# Patient Record
Sex: MALE | Race: WHITE | NOT HISPANIC OR LATINO | URBAN - METROPOLITAN AREA
[De-identification: names, ages, dates, MRNs, and addresses within clinical notes are randomized per-mention and may not be internally consistent; named-entity substitution may affect disease eponyms.]

---

## 2019-04-16 ENCOUNTER — INPATIENT (INPATIENT)
Facility: HOSPITAL | Age: 34
LOS: 0 days | Discharge: HOME | End: 2019-04-17
Attending: INTERNAL MEDICINE | Admitting: INTERNAL MEDICINE
Payer: MEDICAID

## 2019-04-16 VITALS — WEIGHT: 214.95 LBS | HEIGHT: 69 IN

## 2019-04-16 DIAGNOSIS — F11.20 OPIOID DEPENDENCE, UNCOMPLICATED: ICD-10-CM

## 2019-04-16 DIAGNOSIS — R56.9 UNSPECIFIED CONVULSIONS: ICD-10-CM

## 2019-04-16 LAB
ALBUMIN SERPL ELPH-MCNC: 5 G/DL — SIGNIFICANT CHANGE UP (ref 3.5–5.2)
ALP SERPL-CCNC: 47 U/L — SIGNIFICANT CHANGE UP (ref 30–115)
ALT FLD-CCNC: 48 U/L — HIGH (ref 0–41)
AMPHET UR-MCNC: NEGATIVE — SIGNIFICANT CHANGE UP
ANION GAP SERPL CALC-SCNC: 14 MMOL/L — SIGNIFICANT CHANGE UP (ref 7–14)
APAP SERPL-MCNC: <5 UG/ML — LOW (ref 10–30)
APPEARANCE UR: CLEAR — SIGNIFICANT CHANGE UP
APTT BLD: 25.2 SEC — LOW (ref 27–39.2)
AST SERPL-CCNC: 25 U/L — SIGNIFICANT CHANGE UP (ref 0–41)
BARBITURATES UR SCN-MCNC: NEGATIVE — SIGNIFICANT CHANGE UP
BASOPHILS # BLD AUTO: 0.02 K/UL — SIGNIFICANT CHANGE UP (ref 0–0.2)
BASOPHILS NFR BLD AUTO: 0.1 % — SIGNIFICANT CHANGE UP (ref 0–1)
BENZODIAZ UR-MCNC: POSITIVE
BILIRUB SERPL-MCNC: 0.5 MG/DL — SIGNIFICANT CHANGE UP (ref 0.2–1.2)
BILIRUB UR-MCNC: NEGATIVE — SIGNIFICANT CHANGE UP
BUN SERPL-MCNC: 14 MG/DL — SIGNIFICANT CHANGE UP (ref 10–20)
CALCIUM SERPL-MCNC: 9.6 MG/DL — SIGNIFICANT CHANGE UP (ref 8.5–10.1)
CHLORIDE SERPL-SCNC: 100 MMOL/L — SIGNIFICANT CHANGE UP (ref 98–110)
CO2 SERPL-SCNC: 22 MMOL/L — SIGNIFICANT CHANGE UP (ref 17–32)
COCAINE METAB.OTHER UR-MCNC: NEGATIVE — SIGNIFICANT CHANGE UP
COLOR SPEC: YELLOW — SIGNIFICANT CHANGE UP
CREAT SERPL-MCNC: 0.9 MG/DL — SIGNIFICANT CHANGE UP (ref 0.7–1.5)
DIFF PNL FLD: ABNORMAL
DRUG SCREEN 1, URINE RESULT: SIGNIFICANT CHANGE UP
EOSINOPHIL # BLD AUTO: 0 K/UL — SIGNIFICANT CHANGE UP (ref 0–0.7)
EOSINOPHIL NFR BLD AUTO: 0 % — SIGNIFICANT CHANGE UP (ref 0–8)
ETHANOL SERPL-MCNC: <10 MG/DL — SIGNIFICANT CHANGE UP
GLUCOSE SERPL-MCNC: 112 MG/DL — HIGH (ref 70–99)
GLUCOSE UR QL: NEGATIVE MG/DL — SIGNIFICANT CHANGE UP
HCT VFR BLD CALC: 40 % — LOW (ref 42–52)
HGB BLD-MCNC: 14 G/DL — SIGNIFICANT CHANGE UP (ref 14–18)
IMM GRANULOCYTES NFR BLD AUTO: 0.6 % — HIGH (ref 0.1–0.3)
INR BLD: 1.33 RATIO — HIGH (ref 0.65–1.3)
KETONES UR-MCNC: 15
LEUKOCYTE ESTERASE UR-ACNC: NEGATIVE — SIGNIFICANT CHANGE UP
LYMPHOCYTES # BLD AUTO: 1.9 K/UL — SIGNIFICANT CHANGE UP (ref 1.2–3.4)
LYMPHOCYTES # BLD AUTO: 11.9 % — LOW (ref 20.5–51.1)
MCHC RBC-ENTMCNC: 27.7 PG — SIGNIFICANT CHANGE UP (ref 27–31)
MCHC RBC-ENTMCNC: 35 G/DL — SIGNIFICANT CHANGE UP (ref 32–37)
MCV RBC AUTO: 79.1 FL — LOW (ref 80–94)
METHADONE UR-MCNC: NEGATIVE — SIGNIFICANT CHANGE UP
MONOCYTES # BLD AUTO: 1.23 K/UL — HIGH (ref 0.1–0.6)
MONOCYTES NFR BLD AUTO: 7.7 % — SIGNIFICANT CHANGE UP (ref 1.7–9.3)
NEUTROPHILS # BLD AUTO: 12.73 K/UL — HIGH (ref 1.4–6.5)
NEUTROPHILS NFR BLD AUTO: 79.7 % — HIGH (ref 42.2–75.2)
NITRITE UR-MCNC: NEGATIVE — SIGNIFICANT CHANGE UP
NRBC # BLD: 0 /100 WBCS — SIGNIFICANT CHANGE UP (ref 0–0)
OPIATES UR-MCNC: NEGATIVE — SIGNIFICANT CHANGE UP
PCP UR-MCNC: NEGATIVE — SIGNIFICANT CHANGE UP
PH UR: 5.5 — SIGNIFICANT CHANGE UP (ref 5–8)
PLATELET # BLD AUTO: 264 K/UL — SIGNIFICANT CHANGE UP (ref 130–400)
POTASSIUM SERPL-MCNC: 3.9 MMOL/L — SIGNIFICANT CHANGE UP (ref 3.5–5)
POTASSIUM SERPL-SCNC: 3.9 MMOL/L — SIGNIFICANT CHANGE UP (ref 3.5–5)
PROPOXYPHENE QUALITATIVE URINE RESULT: NEGATIVE — SIGNIFICANT CHANGE UP
PROT SERPL-MCNC: 7.6 G/DL — SIGNIFICANT CHANGE UP (ref 6–8)
PROT UR-MCNC: 30 MG/DL
PROTHROM AB SERPL-ACNC: 15.2 SEC — HIGH (ref 9.95–12.87)
RBC # BLD: 5.06 M/UL — SIGNIFICANT CHANGE UP (ref 4.7–6.1)
RBC # FLD: 12.2 % — SIGNIFICANT CHANGE UP (ref 11.5–14.5)
RBC CASTS # UR COMP ASSIST: ABNORMAL /HPF
SALICYLATES SERPL-MCNC: <0.3 MG/DL — LOW (ref 4–30)
SODIUM SERPL-SCNC: 136 MMOL/L — SIGNIFICANT CHANGE UP (ref 135–146)
SP GR SPEC: 1.02 — SIGNIFICANT CHANGE UP (ref 1.01–1.03)
THC UR QL: NEGATIVE — SIGNIFICANT CHANGE UP
TROPONIN T SERPL-MCNC: <0.01 NG/ML — SIGNIFICANT CHANGE UP
UROBILINOGEN FLD QL: 0.2 MG/DL — SIGNIFICANT CHANGE UP (ref 0.2–0.2)
WBC # BLD: 15.98 K/UL — HIGH (ref 4.8–10.8)
WBC # FLD AUTO: 15.98 K/UL — HIGH (ref 4.8–10.8)
WBC UR QL: SIGNIFICANT CHANGE UP /HPF

## 2019-04-16 PROCEDURE — 71046 X-RAY EXAM CHEST 2 VIEWS: CPT | Mod: 26

## 2019-04-16 PROCEDURE — 99285 EMERGENCY DEPT VISIT HI MDM: CPT

## 2019-04-16 PROCEDURE — 70450 CT HEAD/BRAIN W/O DYE: CPT | Mod: 26

## 2019-04-16 RX ORDER — PHENOBARBITAL 60 MG
32.4 TABLET ORAL EVERY 6 HOURS
Qty: 0 | Refills: 0 | Status: DISCONTINUED | OUTPATIENT
Start: 2019-04-18 | End: 2019-04-17

## 2019-04-16 RX ORDER — METHADONE HYDROCHLORIDE 40 MG/1
10 TABLET ORAL EVERY 12 HOURS
Qty: 0 | Refills: 0 | Status: DISCONTINUED | OUTPATIENT
Start: 2019-04-16 | End: 2019-04-17

## 2019-04-16 RX ORDER — PHENOBARBITAL 60 MG
TABLET ORAL
Qty: 0 | Refills: 0 | Status: DISCONTINUED | OUTPATIENT
Start: 2019-04-16 | End: 2019-04-17

## 2019-04-16 RX ORDER — OXYCODONE HYDROCHLORIDE 5 MG/1
1 TABLET ORAL
Qty: 0 | Refills: 0 | COMMUNITY

## 2019-04-16 RX ORDER — NICOTINE POLACRILEX 2 MG
1 GUM BUCCAL DAILY
Qty: 0 | Refills: 0 | Status: DISCONTINUED | OUTPATIENT
Start: 2019-04-16 | End: 2019-04-17

## 2019-04-16 RX ORDER — TETANUS TOXOID, REDUCED DIPHTHERIA TOXOID AND ACELLULAR PERTUSSIS VACCINE, ADSORBED 5; 2.5; 8; 8; 2.5 [IU]/.5ML; [IU]/.5ML; UG/.5ML; UG/.5ML; UG/.5ML
0.5 SUSPENSION INTRAMUSCULAR ONCE
Qty: 0 | Refills: 0 | Status: DISCONTINUED | OUTPATIENT
Start: 2019-04-16 | End: 2019-04-16

## 2019-04-16 RX ORDER — PHENOBARBITAL 60 MG
32.4 TABLET ORAL EVERY 12 HOURS
Qty: 0 | Refills: 0 | Status: CANCELLED | OUTPATIENT
Start: 2019-04-19 | End: 2019-04-17

## 2019-04-16 RX ORDER — PHENOBARBITAL 60 MG
32.4 TABLET ORAL EVERY 6 HOURS
Qty: 0 | Refills: 0 | Status: DISCONTINUED | OUTPATIENT
Start: 2019-04-16 | End: 2019-04-17

## 2019-04-16 RX ORDER — DIAZEPAM 5 MG
10 TABLET ORAL ONCE
Qty: 0 | Refills: 0 | Status: DISCONTINUED | OUTPATIENT
Start: 2019-04-16 | End: 2019-04-16

## 2019-04-16 RX ORDER — TETANUS TOXOID, REDUCED DIPHTHERIA TOXOID AND ACELLULAR PERTUSSIS VACCINE, ADSORBED 5; 2.5; 8; 8; 2.5 [IU]/.5ML; [IU]/.5ML; UG/.5ML; UG/.5ML; UG/.5ML
0.5 SUSPENSION INTRAMUSCULAR ONCE
Qty: 0 | Refills: 0 | Status: COMPLETED | OUTPATIENT
Start: 2019-04-16 | End: 2019-04-16

## 2019-04-16 RX ADMIN — Medication 32.4 MILLIGRAM(S): at 23:27

## 2019-04-16 RX ADMIN — Medication 32.4 MILLIGRAM(S): at 17:48

## 2019-04-16 RX ADMIN — TETANUS TOXOID, REDUCED DIPHTHERIA TOXOID AND ACELLULAR PERTUSSIS VACCINE, ADSORBED 0.5 MILLILITER(S): 5; 2.5; 8; 8; 2.5 SUSPENSION INTRAMUSCULAR at 12:27

## 2019-04-16 RX ADMIN — Medication 10 MILLIGRAM(S): at 14:22

## 2019-04-16 NOTE — H&P ADULT - HISTORY OF PRESENT ILLNESS
34 y/o male reports poor sleep last, went to work today and reportedly passed out, fell to floor outside and began convulsing. Witnessed seizure, arms flailing, grunting sounds, post ictal confusion, no urinary incontinence. no occurences like this in the past.           Upon further conversing patient admitted to substance abuse:  -Xanax: 5-6 days/week,  AVG intake: 2-3 mg/day, duration 2-3 months, last taken 24-48 hrs ago.  -Opiates: on/off X 7 yrs, Oxycodone 30mg Tabs, AVG intake: 120mg/day, last used 5 days ago but took 1/4 of a 8/2mg      Suboxone tab today.   -Alcohol: No

## 2019-04-16 NOTE — CONSULT NOTE ADULT - ATTENDING COMMENTS
History and exam as above confirmed with patient. Plan also discussed. Will do VEEG to assess for ongoing seizure activity.

## 2019-04-16 NOTE — H&P ADULT - NSHPLABSRESULTS_GEN_ALL_CORE
14.0   15.98 )-----------( 264      ( 16 Apr 2019 12:37 )             40.0       04-16    136  |  100  |  14  ----------------------------<  112<H>  3.9   |  22  |  0.9    Ca    9.6      16 Apr 2019 12:37    TPro  7.6  /  Alb  5.0  /  TBili  0.5  /  DBili  x   /  AST  25  /  ALT  48<H>  /  AlkPhos  47  04-16        PT/INR - ( 16 Apr 2019 12:37 )   PT: 15.20 sec;   INR: 1.33 ratio         PTT - ( 16 Apr 2019 12:37 )  PTT:25.2 sec      CARDIAC MARKERS ( 16 Apr 2019 12:37 )  x     / <0.01 ng/mL / x     / x     / x       CT Head No Cont (04.16.19 @ 12:57) >      No acute intracranial hemorrhage.    In view of the history of seizure, MRI of the brain is recommended for   further evaluation if clinically warranted and not contra-indicated in   this patient.    Xray Chest 2 Views PA/Lat (04.16.19 @ 12:42) >      Impression:    No radiographic evidence of cardiopulmonary disease.

## 2019-04-16 NOTE — CONSULT NOTE ADULT - SUBJECTIVE AND OBJECTIVE BOX
Neurology/Epilepsy Consult:    BJ BARKER 33y Male  [id number]    Patient is a 33y old right-handed Male who presents with a chief complaint of New Onset Seizure (2019 14:04)      HPI:  34 y/o male reports poor sleep last, went to work today and reportedly passed out, fell to floor outside and began convulsing. Witnessed seizure, arms flailing, grunting sounds, post ictal confusion, no urinary incontinence. no occurences like this in the past.           Upon further conversing patient admitted to substance abuse:  -Xanax: 5-6 days/week,  AVG intake: 2-3 mg/day, duration 2-3 months, last taken 24-48 hrs ago.  -Opiates: on/off X 7 yrs, Oxycodone 30mg Tabs, AVG intake: 120mg/day, last used 5 days ago but took 1/4 of a 8/2mg      Suboxone tab today.   -Alcohol: No (2019 14:04)        PAST MEDICAL & SURGICAL HISTORY:  Herpes simplex type 1 infection  Opiate dependence  Benzodiazepine abuse  Tobacco dependence  Chronic back pain  No significant past surgical history        FAMILY HISTORY:        Social History: (-) x 3      Risk factors:  Born full-term, , no complications  Normal growth and development  No febrile seizures/CNS infections  No head trauma with loss of consciousness      Allergy:  No Known Allergies        Home Medications 9as per I-STOP):  Oxycodone 30 mg tabs: 1 tab 4 times per day PRN (last filled 3/28/2019)      MEDICATIONS  (STANDING):  nicotine -  14 mG/24Hr(s) Patch 1 Patch Transdermal daily  PHENobarbital 32.4 milliGRAM(s) Oral every 6 hours  PHENobarbital   Oral     MEDICATIONS  (PRN):  LORazepam   Injectable 2 milliGRAM(s) IV Push once PRN generalized tonic-clonic seizure lasting longer than 2 minutes  methadone    Tablet 10 milliGRAM(s) Oral every 12 hours PRN opiate withdrawal          T(F): 97.9 (19 @ 13:59), Max: 98.4 (19 @ 11:11)  HR: 83 (19 @ 13:59) (83 - 83)  BP: 144/81 (19 @ 13:59) (122/65 - 144/81)  RR: 18 (19 @ 13:59) (18 - 18)  SpO2: 100% (-19 @ 13:59) (97% - 100%)      Neurologic Examination:  General:  Appearance is consistent with chronologic age.  No abnormal facies.   General: The patient is oriented to person, place, time and date.  Recent and remote memory intact.  Follows 4-step directions. Fund of knowledge is intact and normal.  Language with normal repetition, comprehension and naming.  Nondysarthric.    Cranial nerves: EOMI w/o nystagmus, skew or reported double vision.  PERRL.  No ptosis/weakness of eyelid closure.  Facial sensation is normal with normal bite.  No facial asymmetry.  Hearing grossly intact b/l.  Palate elevates midline.  Tongue midline.  Motor examination:   Normal tone, bulk and range of motion.  No tenderness, twitching, tremors or involuntary movements.  Formal Muscle Strength Testin/5 UE; 5/5 LE.  No observable drift.  Reflexes:   2+ b/l pectoralis, biceps, triceps, brachioradialis, patella and Achilles.  Plantar response downgoing b/l.  Jaw jerk, Jemima, clonus absent.  Sensory examination:   Intact to light touch and pinprick, pain, temperature and proprioception and vibration in all extremities.  Cerebellum:   FTN/HKS intact with normal JANAE in all limbs.  No dysmetria or dysdiadokinesia.  Gait narrow based and normal.      Labs:                         14.0   15.98 )-----------( 264      ( 2019 12:37 )             40.0     -16    136  |  100  |  14  ----------------------------<  112<H>  3.9   |  22  |  0.9    Ca    9.6      2019 12:37    TPro  7.6  /  Alb  5.0  /  TBili  0.5  /  DBili  x   /  AST  25  /  ALT  48<H>  /  AlkPhos  47  -    LIVER FUNCTIONS - ( 2019 12:37 )  Alb: 5.0 g/dL / Pro: 7.6 g/dL / ALK PHOS: 47 U/L / ALT: 48 U/L / AST: 25 U/L / GGT: x           PT/INR - ( 2019 12:37 )   PT: 15.20 sec;   INR: 1.33 ratio         PTT - ( 2019 12:37 )  PTT:25.2 sec  Urinalysis Basic - ( 2019 13:44 )    Color: Yellow / Appearance: Clear / S.025 / pH: x  Gluc: x / Ketone: 15  / Bili: Negative / Urobili: 0.2 mg/dL   Blood: x / Protein: 30 mg/dL / Nitrite: Negative   Leuk Esterase: Negative / RBC: 3-5 /HPF / WBC 3-5 /HPF   Sq Epi: x / Non Sq Epi: x / Bacteria: x          Neuroimaging:  NCHCT: CT Head No Cont:   EXAM:  CT BRAIN          PROCEDURE DATE:  2019      INTERPRETATION:  Clinical History / Reason for exam: Seizure.    CT SCAN OF THE BRAIN WITHOUT CONTRAST    TECHNIQUE:    Multiple transaxial noncontrast CT images of the brain were obtained from   base to vertex. Sagittal and coronal reformatted images were submitted.    COMPARISON:    Noncontrast CT scan of the brain dated 2008.    FINDINGS:    The third, fourth, and lateral ventricles are normal in size and   position.     There is no shift of the midline structures or evidence of acute   intracranial hemorrhage or depressed skull fracture.     There is a 0.5 cm hypodense lesion in the midline just superior to the   third ventricle consistent with a lipoma.    IMPRESSION:     In comparison with the prior noncontrast CT scan of the brain dated   2018:    Stable examination.    No acute intracranial hemorrhage.    In view of the history of seizure, MRI of the brain is recommended for   further evaluation if clinically warranted and not contra-indicated in   this patient.    BIANKA LARKIN M.D., ATTENDING RADIOLOGIST  This document has been electronically signed. 2019  1:22PM  (19 @ 12:57)        Assessment:  This is a 33y Male with h/o polysubstance use, admitted s/p witnessed GTC seizure. Patient denies prior seizure history.      Discussed with Dr. Candelario      Plan:   - VEEG for better characterization and assessment  - Seizure precautions  - No AED fro now  - Ativan 2mg IV PRN for generalized tonic-clonic seizure lasting longer than 2 minutes, or two consecutive seizures without return to baseline in-between (ordered)  - CBC, CMP, Mg, CK (ordered)  - Keep Mg above 2  - Urine drug screen stat (ordered)    Plan discussed with patient in details, all questions answered Neurology/Epilepsy Consult:    BJ BARKER 33y Male  [id number]    Patient is a 33y old right-handed Male who presents with a chief complaint of New Onset Seizure (2019 14:04)      HPI: History obtained from patient and mother. I-STOP records reviewed.  Patient was BIBA after a witnessed seizure at work. Patient reportedly appeared very anxious and was pacing, then he fell and was unresponsive for several minutes with 4 extremities shaking followed by several minutes of confusion. No incontinence, no Daniel's paralysis, no tongue bite reported. Patient denies any prior seizure history. He reports having very limited sleep for the last 3 days.    When patient was interviewed without mom's presence, he reported taking controlled substances. He ran out of Oxycodone several days ago that Rx to him by PMD for back pain (last use 4/10/19 in PM). It must be noted that per I-STOP patient filled Oxycodone 30mg 120 tablets 3/28/19. In addition, patient uses Xanax 2-4 mg/day that is not prescribed to him, last use 19. In the last few days patient also used 2 strips of Suboxone 8mg/2mg that he bought without Rx. Patient reports taking high doses of opiates for about 6 years, started using Xanax several months ago.              PAST MEDICAL & SURGICAL HISTORY:  Opiate dependence  Benzodiazepine abuse  Tobacco dependence  Chronic back pain  No significant past surgical history        FAMILY HISTORY:  No seizures      Social History:   Lives with parents  Self-employed, works FT  Completed 2 yrs of college        Risk factors:  Born full-term, , no complications  Normal growth and development  No febrile seizures/CNS infections  No head trauma with loss of consciousness      Allergy:  No Known Allergies        Home Medications):  Oxycodone 30 mg tabs: 1 tab 4 times per day PRN (last filled 3/28/2019)  Xanax 2-4 mg/day (not prescribed)  Suboxone occasionally (not prescribed)      MEDICATIONS  (STANDING):  nicotine -  14 mG/24Hr(s) Patch 1 Patch Transdermal daily  PHENobarbital 32.4 milliGRAM(s) Oral every 6 hours  PHENobarbital   Oral     MEDICATIONS  (PRN):  LORazepam   Injectable 2 milliGRAM(s) IV Push once PRN generalized tonic-clonic seizure lasting longer than 2 minutes  methadone    Tablet 10 milliGRAM(s) Oral every 12 hours PRN opiate withdrawal          T(F): 97.9 (04-16-19 @ 13:59), Max: 98.4 (19 @ 11:11)  HR: 83 (19 @ 13:59) (83 - 83)  BP: 144/81 (19 @ 13:59) (122/65 - 144/81)  RR: 18 (19 @ 13:59) (18 - 18)  SpO2: 100% (19 @ 13:59) (97% - 100%)      Neurologic Examination:  General:  Appearance is consistent with chronologic age.  No abnormal facies.   General: The patient is oriented to person, place, time and date.  Recent and remote memory intact.  Follows 4-step directions. Fund of knowledge is intact and normal.  Language with normal repetition, comprehension and naming.  Nondysarthric.    Cranial nerves: EOMI w/o nystagmus, skew or reported double vision.  PERRL.  No ptosis/weakness of eyelid closure.  Facial sensation is normal with normal bite.  No facial asymmetry.  Hearing grossly intact b/l.  Palate elevates midline.  Tongue midline.  Motor examination:   Normal tone, bulk and range of motion.  No tenderness, twitching, tremors or involuntary movements.  Formal Muscle Strength Testin/5 UE; 5/5 LE.  No observable drift.  Reflexes:   2+ b/l   Sensory examination:   Intact to light touch and pinprick, pain  Cerebellum:   FTN/HKS intact with normal JANAE in all limbs.  No dysmetria or dysdiadokinesia.  Gait narrow based and normal.      Labs:                         14.0   15.98 )-----------( 264      ( 2019 12:37 )             40.0     -16    136  |  100  |  14  ----------------------------<  112<H>  3.9   |  22  |  0.9    Ca    9.6      2019 12:37    TPro  7.6  /  Alb  5.0  /  TBili  0.5  /  DBili  x   /  AST  25  /  ALT  48<H>  /  AlkPhos  47  -    LIVER FUNCTIONS - ( 2019 12:37 )  Alb: 5.0 g/dL / Pro: 7.6 g/dL / ALK PHOS: 47 U/L / ALT: 48 U/L / AST: 25 U/L / GGT: x           PT/INR - ( 2019 12:37 )   PT: 15.20 sec;   INR: 1.33 ratio         PTT - ( 2019 12:37 )  PTT:25.2 sec  Urinalysis Basic - ( 2019 13:44 )    Color: Yellow / Appearance: Clear / S.025 / pH: x  Gluc: x / Ketone: 15  / Bili: Negative / Urobili: 0.2 mg/dL   Blood: x / Protein: 30 mg/dL / Nitrite: Negative   Leuk Esterase: Negative / RBC: 3-5 /HPF / WBC 3-5 /HPF   Sq Epi: x / Non Sq Epi: x / Bacteria: x          Neuroimaging:  NCHCT: CT Head No Cont:   EXAM:  CT BRAIN          PROCEDURE DATE:  2019      INTERPRETATION:  Clinical History / Reason for exam: Seizure.    CT SCAN OF THE BRAIN WITHOUT CONTRAST    TECHNIQUE:    Multiple transaxial noncontrast CT images of the brain were obtained from   base to vertex. Sagittal and coronal reformatted images were submitted.    COMPARISON:    Noncontrast CT scan of the brain dated 2008.    FINDINGS:    The third, fourth, and lateral ventricles are normal in size and   position.     There is no shift of the midline structures or evidence of acute   intracranial hemorrhage or depressed skull fracture.     There is a 0.5 cm hypodense lesion in the midline just superior to the   third ventricle consistent with a lipoma.    IMPRESSION:     In comparison with the prior noncontrast CT scan of the brain dated   2018:    Stable examination.    No acute intracranial hemorrhage.    In view of the history of seizure, MRI of the brain is recommended for   further evaluation if clinically warranted and not contra-indicated in   this patient.    BIANKA LARKIN M.D., ATTENDING RADIOLOGIST  This document has been electronically signed. 2019  1:22PM  (19 @ 12:57)        Assessment:  This is a 33y Male with h/o polysubstance use, admitted s/p witnessed GTC seizure. Patient denies prior seizure history. The history is highly suggestive of provoked seizure, but the possibility of underlying seizure disorder needs to be ruled out.      Discussed with Dr. Candelario      Plan:   - VEEG for better characterization and assessment  - Seizure precautions  - No AED for now  - Ativan 2mg IV PRN for generalized tonic-clonic seizure lasting longer than 2 minutes, or two consecutive seizures without return to baseline in-between (ordered)  - CBC, CMP, Mg, CK (ordered)  - Keep Mg above 2  - Urine drug screen stat (ordered)  - Detox consult    Plan discussed with patient in details, all questions answered Neurology/Epilepsy Consult:    BJ BARKER 33y Male  MRN-[id number]    Patient is a 33y old right-handed Male who presents with a chief complaint of New Onset Seizure (2019 14:04)      HPI: History obtained from patient and mother. [facility] records reviewed.  Patient was BIBA after a witnessed seizure at work. Patient reportedly appeared very anxious and was pacing, then he fell and was unresponsive for several minutes with 4 extremities shaking followed by several minutes of confusion. No incontinence, no Daniel's paralysis, no tongue bite reported. Patient denies any prior seizure history. He reports having very limited sleep for the last 3 days.    When patient was interviewed without mom's presence, he reported taking controlled substances. He ran out of Oxycodone several days ago that Rx to him by PMD for back pain (last use 4/10/19 in PM). It must be noted that per I-STOP patient filled Oxycodone 30mg 120 tablets 3/28/19. In addition, patient uses Xanax 2-4 mg/day that is not prescribed to him, last use 19. In the last few days patient also used 2 strips of Suboxone 8mg/2mg that he bought without Rx. Patient reports taking high doses of opiates for about 6 years, started using Xanax several months ago.              PAST MEDICAL & SURGICAL HISTORY:  Opiate dependence  Benzodiazepine abuse  Tobacco dependence  Chronic back pain  No significant past surgical history        FAMILY HISTORY:  No seizures      Social History:   Lives with parents  Self-employed, works FT  Completed 2 yrs of college        Risk factors:  Born full-term, , no complications  Normal growth and development  No febrile seizures/CNS infections  No head trauma with loss of consciousness      Allergy:  No Known Allergies        Home Medications):  Oxycodone 30 mg tabs: 1 tab 4 times per day PRN (last filled 3/28/2019)  Xanax 2-4 mg/day (not prescribed)  Suboxone occasionally (not prescribed)      MEDICATIONS  (STANDING):  nicotine -  14 mG/24Hr(s) Patch 1 Patch Transdermal daily  PHENobarbital 32.4 milliGRAM(s) Oral every 6 hours  PHENobarbital   Oral     MEDICATIONS  (PRN):  LORazepam   Injectable 2 milliGRAM(s) IV Push once PRN generalized tonic-clonic seizure lasting longer than 2 minutes  methadone    Tablet 10 milliGRAM(s) Oral every 12 hours PRN opiate withdrawal          T(F): 97.9 (04-16-19 @ 13:59), Max: 98.4 (19 @ 11:11)  HR: 83 (19 @ 13:59) (83 - 83)  BP: 144/81 (19 @ 13:59) (122/65 - 144/81)  RR: 18 (19 @ 13:59) (18 - 18)  SpO2: 100% (19 @ 13:59) (97% - 100%)      Neurologic Examination:  General:  Appearance is consistent with chronologic age.  No abnormal facies.   General: The patient is oriented to person, place, time and date.  Recent and remote memory intact.  Follows 4-step directions. Fund of knowledge is intact and normal.  Language with normal repetition, comprehension and naming.  Nondysarthric.    Cranial nerves: EOMI w/o nystagmus, skew or reported double vision.  PERRL.  No ptosis/weakness of eyelid closure.  Facial sensation is normal with normal bite.  No facial asymmetry.  Hearing grossly intact b/l.  Palate elevates midline.  Tongue midline.  Motor examination:   Normal tone, bulk and range of motion.  No tenderness, twitching, tremors or involuntary movements.  Formal Muscle Strength Testin/5 UE; 5/5 LE.  No observable drift.  Reflexes:   2+ b/l   Sensory examination:   Intact to light touch and pinprick, pain  Cerebellum:   FTN/HKS intact with normal JANAE in all limbs.  No dysmetria or dysdiadokinesia.  Gait narrow based and normal.      Labs:                         14.0   15.98 )-----------( 264      ( 2019 12:37 )             40.0     -16    136  |  100  |  14  ----------------------------<  112<H>  3.9   |  22  |  0.9    Ca    9.6      2019 12:37    TPro  7.6  /  Alb  5.0  /  TBili  0.5  /  DBili  x   /  AST  25  /  ALT  48<H>  /  AlkPhos  47  -    LIVER FUNCTIONS - ( 2019 12:37 )  Alb: 5.0 g/dL / Pro: 7.6 g/dL / ALK PHOS: 47 U/L / ALT: 48 U/L / AST: 25 U/L / GGT: x           PT/INR - ( 2019 12:37 )   PT: 15.20 sec;   INR: 1.33 ratio         PTT - ( 2019 12:37 )  PTT:25.2 sec  Urinalysis Basic - ( 2019 13:44 )    Color: Yellow / Appearance: Clear / S.025 / pH: x  Gluc: x / Ketone: 15  / Bili: Negative / Urobili: 0.2 mg/dL   Blood: x / Protein: 30 mg/dL / Nitrite: Negative   Leuk Esterase: Negative / RBC: 3-5 /HPF / WBC 3-5 /HPF   Sq Epi: x / Non Sq Epi: x / Bacteria: x          Neuroimaging:  NCHCT: CT Head No Cont:   EXAM:  CT BRAIN          PROCEDURE DATE:  2019      INTERPRETATION:  Clinical History / Reason for exam: Seizure.    CT SCAN OF THE BRAIN WITHOUT CONTRAST    TECHNIQUE:    Multiple transaxial noncontrast CT images of the brain were obtained from   base to vertex. Sagittal and coronal reformatted images were submitted.    COMPARISON:    Noncontrast CT scan of the brain dated 2008.    FINDINGS:    The third, fourth, and lateral ventricles are normal in size and   position.     There is no shift of the midline structures or evidence of acute   intracranial hemorrhage or depressed skull fracture.     There is a 0.5 cm hypodense lesion in the midline just superior to the   third ventricle consistent with a lipoma.    IMPRESSION:     In comparison with the prior noncontrast CT scan of the brain dated   2018:    Stable examination.    No acute intracranial hemorrhage.    In view of the history of seizure, MRI of the brain is recommended for   further evaluation if clinically warranted and not contra-indicated in   this patient.    BIANKA LARKIN M.D., ATTENDING RADIOLOGIST  This document has been electronically signed. 2019  1:22PM  (19 @ 12:57) Neurology/Epilepsy Consult:    BJ BARKER 33y Male  MRN-[id number]    Patient is a 33y old right-handed Male who presents with a chief complaint of New Onset Seizure (2019 14:04)      HPI: History obtained from patient and mother. [facility] records reviewed.  Patient was BIBA after a witnessed seizure at work. Patient reportedly appeared very anxious and was pacing, then he fell and was unresponsive for several minutes with 4 extremities shaking followed by several minutes of confusion. No incontinence, no Daniel's paralysis, no tongue bite reported. Patient denies any prior seizure history. He reports having very limited sleep for the last 3 days.    When patient was interviewed without mom's presence, he reported taking controlled substances. He ran out of Oxycodone several days ago (Prescribed to him by PMD for back pain, last use 4/10/19 in PM). It must be noted that per I-STOP patient filled Oxycodone 30mg 120 tablets 3/28/19. In addition, patient uses Xanax 2-4 mg/day that is not prescribed to him, last use 19. In the last few days patient also used 2 strips of Suboxone 8mg/2mg that he bought without Rx. Patient reports taking high doses of opiates for about 6 years, started using Xanax several months ago.              PAST MEDICAL & SURGICAL HISTORY:  Opiate dependence  Benzodiazepine abuse  Tobacco dependence  Chronic back pain  No significant past surgical history        FAMILY HISTORY:  No seizures      Social History:   Lives with parents  Self-employed, works FT  Completed 2 yrs of college        Risk factors:  Born full-term, , no complications  Normal growth and development  No febrile seizures/CNS infections  No head trauma with loss of consciousness      Allergy:  No Known Allergies        Home Medications):  Oxycodone 30 mg tabs: 1 tab 4 times per day PRN (last filled 3/28/2019)  Xanax 2-4 mg/day (not prescribed)  Suboxone occasionally (not prescribed)      MEDICATIONS  (STANDING):  nicotine -  14 mG/24Hr(s) Patch 1 Patch Transdermal daily  PHENobarbital 32.4 milliGRAM(s) Oral every 6 hours  PHENobarbital   Oral     MEDICATIONS  (PRN):  LORazepam   Injectable 2 milliGRAM(s) IV Push once PRN generalized tonic-clonic seizure lasting longer than 2 minutes  methadone    Tablet 10 milliGRAM(s) Oral every 12 hours PRN opiate withdrawal      T(F): 97.9 (04-16-19 @ 13:59), Max: 98.4 (19 @ 11:11)  HR: 83 (19 @ 13:59) (83 - 83)  BP: 144/81 (19 @ 13:59) (122/65 - 144/81)  RR: 18 (19 @ 13:59) (18 - 18)  SpO2: 100% (19 @ 13:59) (97% - 100%)      Neurologic Examination:  General:  Appearance is consistent with chronologic age.  No abnormal facies.   General: The patient is oriented to person, place, time and date.  Recent and remote memory intact.  Follows 4-step directions. Fund of knowledge is intact and normal.  Language with normal repetition, comprehension and naming.  Nondysarthric.    Cranial nerves: EOMI w/o nystagmus, skew or reported double vision.  PERRL.  No ptosis/weakness of eyelid closure.  Facial sensation is normal with normal bite.  No facial asymmetry.  Hearing grossly intact b/l.  Palate elevates midline.  Tongue midline.  Motor examination:   Normal tone, bulk and range of motion.  No tenderness, twitching, tremors or involuntary movements.  Formal Muscle Strength Testin/5 UE; 5/5 LE.  No observable drift.  Reflexes:   2+ b/l   Sensory examination:   Intact to light touch and pinprick, pain  Cerebellum:   FTN/HKS intact with normal JANAE in all limbs.  No dysmetria or dysdiadokinesia.  Gait narrow based and normal.      Labs:                         14.0   15.98 )-----------( 264      ( 2019 12:37 )             40.0     -16    136  |  100  |  14  ----------------------------<  112<H>  3.9   |  22  |  0.9    Ca    9.6      2019 12:37    TPro  7.6  /  Alb  5.0  /  TBili  0.5  /  DBili  x   /  AST  25  /  ALT  48<H>  /  AlkPhos  47  -    LIVER FUNCTIONS - ( 2019 12:37 )  Alb: 5.0 g/dL / Pro: 7.6 g/dL / ALK PHOS: 47 U/L / ALT: 48 U/L / AST: 25 U/L / GGT: x           PT/INR - ( 2019 12:37 )   PT: 15.20 sec;   INR: 1.33 ratio         PTT - ( 2019 12:37 )  PTT:25.2 sec  Urinalysis Basic - ( 2019 13:44 )    Color: Yellow / Appearance: Clear / S.025 / pH: x  Gluc: x / Ketone: 15  / Bili: Negative / Urobili: 0.2 mg/dL   Blood: x / Protein: 30 mg/dL / Nitrite: Negative   Leuk Esterase: Negative / RBC: 3-5 /HPF / WBC 3-5 /HPF   Sq Epi: x / Non Sq Epi: x / Bacteria: x          Neuroimaging:  NCHCT: CT Head No Cont:   EXAM:  CT BRAIN          PROCEDURE DATE:  2019      INTERPRETATION:  Clinical History / Reason for exam: Seizure.    CT SCAN OF THE BRAIN WITHOUT CONTRAST    TECHNIQUE:    Multiple transaxial noncontrast CT images of the brain were obtained from   base to vertex. Sagittal and coronal reformatted images were submitted.    COMPARISON:    Noncontrast CT scan of the brain dated 2008.    FINDINGS:    The third, fourth, and lateral ventricles are normal in size and   position.     There is no shift of the midline structures or evidence of acute   intracranial hemorrhage or depressed skull fracture.     There is a 0.5 cm hypodense lesion in the midline just superior to the   third ventricle consistent with a lipoma.    IMPRESSION:     In comparison with the prior noncontrast CT scan of the brain dated   2018:    Stable examination.    No acute intracranial hemorrhage.    In view of the history of seizure, MRI of the brain is recommended for   further evaluation if clinically warranted and not contra-indicated in   this patient.    BIANKA LARKIN M.D., ATTENDING RADIOLOGIST  This document has been electronically signed. 2019  1:22PM  (19 @ 12:57)

## 2019-04-16 NOTE — H&P ADULT - NSICDXPASTMEDICALHX_GEN_ALL_CORE_FT
PAST MEDICAL HISTORY:  Back pain     Benzodiazepine abuse     Drug withdrawal seizure     Opiate dependence     Tobacco dependence PAST MEDICAL HISTORY:  Back pain     Benzodiazepine abuse     Drug withdrawal seizure     Herpes simplex type 1 infection     Opiate dependence     Tobacco dependence

## 2019-04-16 NOTE — H&P ADULT - ASSESSMENT
32 y/o male with substance abuse issues admitted with new onset seizure which is likely a Benzodiazepine withdrawal seizure.

## 2019-04-16 NOTE — H&P ADULT - NSHPPHYSICALEXAM_GEN_ALL_CORE
PHYSICAL EXAM:    Vital Signs Last 24 Hrs    T(F): 97.9 (04-16-19 @ 13:59), Max: 98.4 (04-16-19 @ 11:11)  HR: 83 (04-16-19 @ 13:59) (83 - 83)  BP: 144/81 (04-16-19 @ 13:59)  RR: 18 (04-16-19 @ 13:59) (18 - 18)  SpO2: 100% (04-16-19 @ 13:59) (97% - 100%)    Constitutional: NAD, A&O x3    Eyes: PERRLA    Respiratory: +air entry, no rales, no rhonchi, no wheezes    Cardiovascular: +S1 and S2, regular rate and rhythm    Gastrointestinal: +BS, soft, non-tender, not distended    Extremities:  no edema, no calf tenderness    Vascular: +dorsal pedis and radial pulses, no extremity cyanosis    Neurological: sensation intact, ROM equal B/L, CN II-XII intact    Skin: no rashes, normal turgor

## 2019-04-16 NOTE — ED PROVIDER NOTE - CLINICAL SUMMARY MEDICAL DECISION MAKING FREE TEXT BOX
pt here with new onset seizure.   work up negative for anything acute in the ED.  nonfocal neuro exam.  pt ambulating in the ED without any dififcutly.  I spoke to neuro attending dr conde who approved pt for admission to the epilepsy unit.   Initially pt had denied any drug use except for occasional xanax.  however, I was informed by medical PA that pt actually had admitted to him regular benzo abuse.   pt was given dose of benzo in the ED at request of medical pa for concern for benzo withdrawal seizure.  pt admitted to epilepsy unit.  pt had knee abrasion.  rom of knee intact, flexion/extension intact in right knee.  pt had no knee tenderness.  pt refused knee xray

## 2019-04-16 NOTE — ED PROVIDER NOTE - PROGRESS NOTE DETAILS
pt refused knee xray spoke with dr. conde who approve for eplipsey unit shahram de souza aware of admission On further questioning without family members present, patient admits to taking xanax 3 mg QD with last dosage 48 hrs ago, oxycodone 120 mg with last dosage 5 days ago and also took a suboxone this morning. will give valium 10 mg and admit

## 2019-04-16 NOTE — ED ADULT NURSE NOTE - NSIMPLEMENTINTERV_GEN_ALL_ED
Implemented All Universal Safety Interventions:  [place] to call system. Call bell, personal items and telephone within reach. Instruct patient to call for assistance. Room bathroom lighting operational. Non-slip footwear when patient is off stretcher. Physically safe environment: no spills, clutter or unnecessary equipment. Stretcher in lowest position, wheels locked, appropriate side rails in place.

## 2019-04-16 NOTE — ED PROVIDER NOTE - ENMT NEGATIVE STATEMENT, MLM
Ears: no ear pain and no hearing problems.Mouth/Throat: , no hoarseness and no throat pain. Neck: no lumps, no pain, no stiffness and no swollen glands.

## 2019-04-16 NOTE — ED PROVIDER NOTE - OBJECTIVE STATEMENT
34yo F with h/o migraines presents s/p seizure. Pt was walking out to his car when he fell to his knee and then to his face with a witnessed seizure. Pt sister witnessed the entire episode that lasted about 5 min, pt was confused when he came to. Pt admits to a lack  of sleep and increased stress but denies any drugs or alcohol, no migraine but did have an aura. Pt is also a smoker. Pt denies any CP, SOB, fever, nausea, vomiting, 34yo F with h/o migraines presents s/p witnessed seizure like activity. Pt was working outdoors this morning, walking out to his car and he fell to his knee and then to his face. Pt sister witnessed the entire episode that lasted about 5 min, pt was confused when he came to. Pt admits to a lack  of sleep and increased stress but denies any drugs or alcohol, no migraine but did have an aura. Pt is also a smoker. Pt denies any CP, SOB, fever, nausea, vomiting, 34yo F with h/o migraines presents s/p witnessed seizure like activity. Pt was working outdoors this morning, walking out to his car and he fell to his knee and then had facial injury. Pt sister witnessed the entire episode that lasted about 5 min, pt had post ictal confusion and agitation. Pt admits to a lack  of sleep and increased stress but denies any drugs or alcohol, no migraine this am. Patient had possible aura prior to onset of symptoms. Pt is also a smoker. Pt denies any CP, SOB, fever, nausea, vomiting, patient with biting of lip, without urinary incontinence

## 2019-04-16 NOTE — ED PROVIDER NOTE - ATTENDING CONTRIBUTION TO CARE
34 yo m with no pmh presents s/p new onset seizure.    Pt was standing outside and and then had witnessed seizure.  no other complaints.  pt has no complaints now.  no headache, no dizziness, no abd pain, no back pain, no cp, no sob.  pt  states he occasioanly takes xanax but no other drug use.    awake, alert.  neck supple.  lungs clear.  abd soft, nontender.  EOMI.  motor/sensation intact in all 4 ext.  no malocclusion.  no jaw tenderness.  + with abrasion to lip.  RLE: + abrasions to the knee.  rom of the knee intact, flexion/extension intact in the knee.  no knee tenderness.   knee is stable.  p:  xrays, ct, labs, tetanus, reassess.

## 2019-04-16 NOTE — CONSULT NOTE ADULT - ASSESSMENT
Assessment:  This is a 33y Male with h/o polysubstance use, admitted s/p witnessed GTC seizure. Patient denies prior seizure history. The history is highly suggestive of provoked seizure, but the possibility of underlying seizure disorder needs to be ruled out.      Discussed with Dr. Candelario      Plan:   - VEEG for better characterization and assessment  - Seizure precautions  - No AED for now  - Ativan 2mg IV PRN for generalized tonic-clonic seizure lasting longer than 2 minutes, or two consecutive seizures without return to baseline in-between (ordered)  - CBC, CMP, Mg, CK (ordered)  - Keep Mg above 2  - Urine drug screen stat (ordered)  - Detox consult    Plan discussed with patient in details, all questions answered

## 2019-04-17 VITALS
RESPIRATION RATE: 18 BRPM | DIASTOLIC BLOOD PRESSURE: 61 MMHG | TEMPERATURE: 97 F | SYSTOLIC BLOOD PRESSURE: 132 MMHG | HEART RATE: 105 BPM

## 2019-04-17 LAB
ALBUMIN SERPL ELPH-MCNC: 4.7 G/DL — SIGNIFICANT CHANGE UP (ref 3.5–5.2)
ALP SERPL-CCNC: 50 U/L — SIGNIFICANT CHANGE UP (ref 30–115)
ALT FLD-CCNC: 41 U/L — SIGNIFICANT CHANGE UP (ref 0–41)
ANION GAP SERPL CALC-SCNC: 12 MMOL/L — SIGNIFICANT CHANGE UP (ref 7–14)
AST SERPL-CCNC: 19 U/L — SIGNIFICANT CHANGE UP (ref 0–41)
BILIRUB SERPL-MCNC: 0.8 MG/DL — SIGNIFICANT CHANGE UP (ref 0.2–1.2)
BUN SERPL-MCNC: 12 MG/DL — SIGNIFICANT CHANGE UP (ref 10–20)
CALCIUM SERPL-MCNC: 9.6 MG/DL — SIGNIFICANT CHANGE UP (ref 8.5–10.1)
CHLORIDE SERPL-SCNC: 101 MMOL/L — SIGNIFICANT CHANGE UP (ref 98–110)
CK SERPL-CCNC: 314 U/L — HIGH (ref 0–225)
CO2 SERPL-SCNC: 27 MMOL/L — SIGNIFICANT CHANGE UP (ref 17–32)
CREAT SERPL-MCNC: 1 MG/DL — SIGNIFICANT CHANGE UP (ref 0.7–1.5)
GLUCOSE SERPL-MCNC: 101 MG/DL — HIGH (ref 70–99)
HCT VFR BLD CALC: 43.6 % — SIGNIFICANT CHANGE UP (ref 42–52)
HGB BLD-MCNC: 14.8 G/DL — SIGNIFICANT CHANGE UP (ref 14–18)
MAGNESIUM SERPL-MCNC: 2.9 MG/DL — HIGH (ref 1.8–2.4)
MCHC RBC-ENTMCNC: 27.7 PG — SIGNIFICANT CHANGE UP (ref 27–31)
MCHC RBC-ENTMCNC: 33.9 G/DL — SIGNIFICANT CHANGE UP (ref 32–37)
MCV RBC AUTO: 81.6 FL — SIGNIFICANT CHANGE UP (ref 80–94)
NRBC # BLD: 0 /100 WBCS — SIGNIFICANT CHANGE UP (ref 0–0)
PLATELET # BLD AUTO: 274 K/UL — SIGNIFICANT CHANGE UP (ref 130–400)
POTASSIUM SERPL-MCNC: 4.4 MMOL/L — SIGNIFICANT CHANGE UP (ref 3.5–5)
POTASSIUM SERPL-SCNC: 4.4 MMOL/L — SIGNIFICANT CHANGE UP (ref 3.5–5)
PROT SERPL-MCNC: 7.2 G/DL — SIGNIFICANT CHANGE UP (ref 6–8)
RBC # BLD: 5.34 M/UL — SIGNIFICANT CHANGE UP (ref 4.7–6.1)
RBC # FLD: 12.5 % — SIGNIFICANT CHANGE UP (ref 11.5–14.5)
SODIUM SERPL-SCNC: 140 MMOL/L — SIGNIFICANT CHANGE UP (ref 135–146)
WBC # BLD: 12.84 K/UL — HIGH (ref 4.8–10.8)
WBC # FLD AUTO: 12.84 K/UL — HIGH (ref 4.8–10.8)

## 2019-04-17 RX ADMIN — Medication 32.4 MILLIGRAM(S): at 05:14

## 2019-04-17 NOTE — PROGRESS NOTE ADULT - ASSESSMENT
33 year old male new onset seizure. No evidence of ongoing seizure activity on overnight EEG. Clear from Epilepsy standpoint to discharge home. Should follow up with neurology in 2-3 weeks.

## 2019-04-17 NOTE — DISCHARGE NOTE PROVIDER - HOSPITAL COURSE
PI:    32 y/o male reports poor sleep last, went to work today and reportedly passed out, fell to floor outside and began convulsing. Witnessed seizure, arms flailing, grunting sounds, post ictal confusion, no urinary incontinence. no occurences like this in the past.               Upon further conversing patient admitted to substance abuse:    -Xanax: 5-6 days/week,  AVG intake: 2-3 mg/day, duration 2-3 months, last taken 24-48 hrs ago.    -Opiates: on/off X 7 yrs, Oxycodone 30mg Tabs, AVG intake: 120mg/day, last used 5 days ago but took 1/4 of a 8/2mg      Suboxone tab today.     -Alcohol: No (16 Apr 2019 14:04)    secondary to benzo withdrawals     refusing narcan kit    d/w family

## 2019-04-17 NOTE — PROGRESS NOTE ADULT - SUBJECTIVE AND OBJECTIVE BOX
[id number]  BJ BARKER  33y    Male    Allergies: No Known Allergies      Medications: LORazepam   Injectable 2 milliGRAM(s) IV Push once PRN  methadone    Tablet 10 milliGRAM(s) Oral every 12 hours PRN  nicotine -  14 mG/24Hr(s) Patch 1 Patch Transdermal daily  PHENobarbital 32.4 milliGRAM(s) Oral every 6 hours  PHENobarbital   Oral       T(C): 36.3 (04-17-19 @ 05:27), Max: 36.9 (04-16-19 @ 11:11)  HR: 72 (04-17-19 @ 05:27) (71 - 83)  BP: 138/80 (04-17-19 @ 05:27) (122/65 - 144/81)  RR: 18 (04-17-19 @ 05:27) (18 - 18)  SpO2: 100% (04-16-19 @ 13:59) (97% - 100%)      Did well overnight. No seizures. Slept well. No complaints this AM.    VEEG shows well organized and symmetric background. There is no slowing or epileptiform activity. No electrographic seizures.    PHYSICAL EXAM:    Awake and conversive. Oriiented x 3. Follows directions well.    Neurological: CN II-XII in tact. No nystagmus. Motor full strength x 4.

## 2019-04-17 NOTE — DISCHARGE NOTE NURSING/CASE MANAGEMENT/SOCIAL WORK - NSDCDPATPORTLINK_GEN_ALL_CORE
You can access the [facility], offered by [facility], by registering with the following website: [email or web address]

## 2019-04-17 NOTE — PROGRESS NOTE ADULT - SUBJECTIVE AND OBJECTIVE BOX
Patient is a 33y old  Male who presents with a chief complaint of New Onset Seizure (2019 07:21)      HPI:  34 y/o male reports poor sleep last, went to work today and reportedly passed out, fell to floor outside and began convulsing. Witnessed seizure, arms flailing, grunting sounds, post ictal confusion, no urinary incontinence. no occurences like this in the past.           Upon further conversing patient admitted to substance abuse:  -Xanax: 5-6 days/week,  AVG intake: 2-3 mg/day, duration 2-3 months, last taken 24-48 hrs ago.  -Opiates: on/off X 7 yrs, Oxycodone 30mg Tabs, AVG intake: 120mg/day, last used 5 days ago but took 1/4 of a 8/2mg      Suboxone tab today.   -Alcohol: No (2019 14:04)      INTERVAL HPI/OVERNIGHT EVENTS:  T(C): 36.3 (19 @ 05:27), Max: 36.9 (19 @ 11:11)  HR: 72 (19 @ 05:27) (71 - 83)  BP: 138/80 (19 @ 05:27) (122/65 - 144/81)  RR: 18 (19 @ 05:27) (18 - 18)  SpO2: 100% (19 @ 13:59) (97% - 100%)  Wt(kg): --  I&O's Summary        PHYSICAL EXAM:  GENERAL: NAD, well-groomed, well-developed  HEAD:  Atraumatic, Normocephalic  EYES: EOMI, PERRLA, conjunctiva and sclera clear  ENMT: No tonsillar erythema, exudates, or enlargement; Moist mucous membranes, Good dentition, No lesions  NECK: Supple, No JVD, Normal thyroid  NERVOUS SYSTEM:  Alert & Oriented X3, Good concentration; Motor Strength 5/5 B/L upper and lower extremities; DTRs 2+ intact and symmetric  CHEST/LUNG: Clear to percussion bilaterally; No rales, rhonchi, wheezing, or rubs  HEART: Regular rate and rhythm; No murmurs, rubs, or gallops  ABDOMEN: Soft, Nontender, Nondistended; Bowel sounds present  EXTREMITIES:  2+ Peripheral Pulses, No clubbing, cyanosis, or edema  LYMPH: No lymphadenopathy noted  SKIN: No rashes or lesions    MEDICATIONS  (STANDING):  nicotine -  14 mG/24Hr(s) Patch 1 Patch Transdermal daily  PHENobarbital 32.4 milliGRAM(s) Oral every 6 hours  PHENobarbital   Oral     MEDICATIONS  (PRN):  LORazepam   Injectable 2 milliGRAM(s) IV Push once PRN generalized tonic-clonic seizure lasting longer than 2 minutes  methadone    Tablet 10 milliGRAM(s) Oral every 12 hours PRN opiate withdrawal      LABS:                        14.8   12.84 )-----------( 274      ( 2019 07:37 )             43.6     04-17    140  |  101  |  12  ----------------------------<  101<H>  4.4   |  27  |  1.0    Ca    9.6      2019 07:37  Mg     2.9     -17    TPro  7.2  /  Alb  4.7  /  TBili  0.8  /  DBili  x   /  AST  19  /  ALT  41  /  AlkPhos  50  04-17    PT/INR - ( 2019 12:37 )   PT: 15.20 sec;   INR: 1.33 ratio         PTT - ( 2019 12:37 )  PTT:25.2 sec  Urinalysis Basic - ( 2019 13:44 )    Color: Yellow / Appearance: Clear / S.025 / pH: x  Gluc: x / Ketone: 15  / Bili: Negative / Urobili: 0.2 mg/dL   Blood: x / Protein: 30 mg/dL / Nitrite: Negative   Leuk Esterase: Negative / RBC: 3-5 /HPF / WBC 3-5 /HPF   Sq Epi: x / Non Sq Epi: x / Bacteria: x      CAPILLARY BLOOD GLUCOSE                  RADIOLOGY & ADDITIONAL TESTS:    Imaging Personally Reviewed:  [ ] YES  [ ] NO    Consultant(s) Notes Reviewed:  [ ] YES  [ ] NO    Palliative Care:    Assessment & Plan:     >>>Uncomplicated opioid dependence: Uncomplicated opioid dependence  >>>Seizure: Seizure withdrawal     plan-------------------------    EEG negative  discharge today   d/w PA no need to talk to mother  refusing narcane kit     DVT ppx     Care Discussed with Consultants/Other Providers [ ] YES  [ ] NO

## 2019-04-20 DIAGNOSIS — G40.509 EPILEPTIC SEIZURES RELATED TO EXTERNAL CAUSES, NOT INTRACTABLE, WITHOUT STATUS EPILEPTICUS: ICD-10-CM

## 2019-04-20 DIAGNOSIS — F11.288 OPIOID DEPENDENCE WITH OTHER OPIOID-INDUCED DISORDER: ICD-10-CM

## 2019-04-20 DIAGNOSIS — F17.200 NICOTINE DEPENDENCE, UNSPECIFIED, UNCOMPLICATED: ICD-10-CM

## 2019-04-20 DIAGNOSIS — G40.909 EPILEPSY, UNSPECIFIED, NOT INTRACTABLE, WITHOUT STATUS EPILEPTICUS: ICD-10-CM

## 2019-04-20 DIAGNOSIS — F13.188: ICD-10-CM

## 2025-03-31 NOTE — ED ADULT NURSE NOTE - NS PRO PASSIVE SMOKE EXP
Rx Refill Request     Name: Blake Christianrinajose  :  1935   Medication Name:      rivaroxaban (Xarelto) 15 mg tablet     Specific Pharmacy location:  [facility], [place], [place]   Date of last appointment:  2025   Date of next appointment:  2025   Best number to reach patient:  [phone]     
Unknown

## 2025-09-03 ENCOUNTER — APPOINTMENT (OUTPATIENT)
Dept: ORTHOPEDIC SURGERY | Facility: CLINIC | Age: 40
End: 2025-09-03
Payer: MEDICAID

## 2025-09-03 VITALS — HEIGHT: 69 IN | BODY MASS INDEX: 34.8 KG/M2 | WEIGHT: 235 LBS

## 2025-09-03 DIAGNOSIS — S96.911A STRAIN OF UNSPECIFIED MUSCLE AND TENDON AT ANKLE AND FOOT LEVEL, RIGHT FOOT, INITIAL ENCOUNTER: ICD-10-CM

## 2025-09-03 PROBLEM — Z00.00 ENCOUNTER FOR PREVENTIVE HEALTH EXAMINATION: Status: ACTIVE | Noted: 2025-09-03

## 2025-09-03 PROCEDURE — 99203 OFFICE O/P NEW LOW 30 MIN: CPT | Mod: 25

## 2025-09-03 RX ORDER — IBUPROFEN 800 MG/1
800 TABLET ORAL 3 TIMES DAILY
Qty: 60 | Refills: 1 | Status: ACTIVE | COMMUNITY
Start: 2025-09-03 | End: 1900-01-01